# Patient Record
Sex: MALE | Race: OTHER | Employment: FULL TIME | URBAN - METROPOLITAN AREA
[De-identification: names, ages, dates, MRNs, and addresses within clinical notes are randomized per-mention and may not be internally consistent; named-entity substitution may affect disease eponyms.]

---

## 2018-08-09 ENCOUNTER — HOSPITAL ENCOUNTER (EMERGENCY)
Age: 28
Discharge: HOME | End: 2018-08-09
Attending: EMERGENCY MEDICINE
Payer: COMMERCIAL

## 2018-08-09 VITALS
BODY MASS INDEX: 24.38 KG/M2 | RESPIRATION RATE: 16 BRPM | HEART RATE: 90 BPM | DIASTOLIC BLOOD PRESSURE: 71 MMHG | HEIGHT: 74 IN | WEIGHT: 190 LBS | OXYGEN SATURATION: 98 % | TEMPERATURE: 99 F | SYSTOLIC BLOOD PRESSURE: 118 MMHG

## 2018-08-09 DIAGNOSIS — K60.2 RECTAL FISSURE: Primary | ICD-10-CM

## 2018-08-09 PROCEDURE — 99282 EMERGENCY DEPT VISIT SF MDM: CPT

## 2018-08-09 RX ORDER — POLYETHYLENE GLYCOL 3350 17 G/17G
17 POWDER, FOR SOLUTION ORAL DAILY PRN
Qty: 12 EACH | Refills: 0 | Status: SHIPPED | OUTPATIENT
Start: 2018-08-09 | End: 2018-09-08

## 2018-08-12 ENCOUNTER — HOSPITAL ENCOUNTER (EMERGENCY)
Age: 28
Discharge: HOME | End: 2018-08-12
Attending: EMERGENCY MEDICINE
Payer: COMMERCIAL

## 2018-08-12 VITALS
BODY MASS INDEX: 24.38 KG/M2 | OXYGEN SATURATION: 99 % | HEART RATE: 72 BPM | HEIGHT: 74 IN | WEIGHT: 190 LBS | SYSTOLIC BLOOD PRESSURE: 131 MMHG | DIASTOLIC BLOOD PRESSURE: 80 MMHG | RESPIRATION RATE: 16 BRPM | TEMPERATURE: 97 F

## 2018-08-12 DIAGNOSIS — K61.0 PERIANAL ABSCESS: Primary | ICD-10-CM

## 2018-08-12 PROCEDURE — 99283 EMERGENCY DEPT VISIT LOW MDM: CPT

## 2018-08-12 RX ORDER — TRAMADOL HYDROCHLORIDE 50 MG/1
TABLET ORAL EVERY 4 HOURS PRN
Qty: 10 TABLET | Refills: 0 | Status: SHIPPED | OUTPATIENT
Start: 2018-08-12 | End: 2018-08-19

## 2018-08-12 RX ORDER — AMOXICILLIN AND CLAVULANATE POTASSIUM 875; 125 MG/1; MG/1
1 TABLET, FILM COATED ORAL 2 TIMES DAILY
Qty: 20 TABLET | Refills: 0 | Status: SHIPPED | OUTPATIENT
Start: 2018-08-12 | End: 2018-08-22

## 2018-08-12 NOTE — ED INITIAL ASSESSMENT (HPI)
Seen here 2 days ago for rectal bleeding-- bright red bleeding continues and notice a \"ball\" to the area

## 2018-08-12 NOTE — ED PROVIDER NOTES
Patient Seen in: [facility] In [place]    History   Patient presents with:  Anal Problem (GI)    Stated Complaint: rectal bleeding    HPI     23-year-old male comes the hospital complaint having difficulty with pain by their of his re motion noted without tenderness  Neuro: No focal deficits noted  Rectal exam shows on the left side of his perianal region very deep a small 2 x 2 centimeter firm region.   This could be consistent with an early phlegmon or early abscess beginning to form

## 2018-08-13 NOTE — ED PROVIDER NOTES
Patient Seen in: [facility] In [place]    History   Patient presents with:  GI Bleeding (gastrointestinal)    Stated Complaint: RECTAL BLEEDING    HPI    23-year-old male presents emergency department who noticed some rectal bleeding mass  Extremities: No clubbing cyanosis or edema 2+ distal pulses. Neuro: Cranial nerves II through XII intact with no gross focal sensory or motor abnormality. Rectal: There is a small anal fissure.   Heme-negative brown stool    ED Course   Labs Reviewe

## (undated) NOTE — LETTER
Date & Time: 8/9/2018, 5:40 PM  Patient: Elmo Orozco  Encounter Provider(s):    Catina Pompa MD       To Whom It May Concern:    Elmo Orozco was seen and treated in our department on 8/9/2018. He can return to work 08/10/18.     If you have any question

## (undated) NOTE — ED AVS SNAPSHOT
Eddie Vincent   MRN: [id number]    Department:  [facility] in [place]   Date of Visit:  8/9/2018           Disclosure     Insurance plans vary and the physician(s) referred by the ER may not be covered by your plan.  Please contact yo tell this physician (or your personal doctor if your instructions are to return to your personal doctor) about any new or lasting problems. The primary care or specialist physician will see patients referred from the [facility] Emergency Department.  Tien Warner

## (undated) NOTE — ED AVS SNAPSHOT
Fernanda Grant   MRN: [id number]    Department:  [facility] in [place]   Date of Visit:  8/12/2018           Disclosure     Insurance plans vary and the physician(s) referred by the ER may not be covered by your plan.  Please contact y tell this physician (or your personal doctor if your instructions are to return to your personal doctor) about any new or lasting problems. The primary care or specialist physician will see patients referred from the [facility] Emergency Department.  Aleks Green

## (undated) NOTE — LETTER
Date & Time: 8/12/2018, 10:52 AM  Patient: Mansi Priest  Encounter Provider(s):    Michael Hancock MD       To Whom It May Concern:    Mansi Priest was seen and treated in our department on 8/12/2018.  He can return to work with these limitations: light